# Patient Record
Sex: MALE | Race: WHITE | Employment: OTHER | URBAN - METROPOLITAN AREA
[De-identification: names, ages, dates, MRNs, and addresses within clinical notes are randomized per-mention and may not be internally consistent; named-entity substitution may affect disease eponyms.]

---

## 2018-04-18 ENCOUNTER — HOSPITAL ENCOUNTER (OUTPATIENT)
Dept: GENERAL RADIOLOGY | Age: 83
Discharge: HOME | End: 2018-04-18
Attending: PHYSICAL MEDICINE & REHABILITATION
Payer: MEDICARE

## 2018-04-18 ENCOUNTER — OFFICE VISIT (OUTPATIENT)
Dept: NEUROLOGY | Facility: CLINIC | Age: 83
End: 2018-04-18

## 2018-04-18 ENCOUNTER — TELEPHONE (OUTPATIENT)
Dept: NEUROLOGY | Facility: CLINIC | Age: 83
End: 2018-04-18

## 2018-04-18 VITALS
SYSTOLIC BLOOD PRESSURE: 120 MMHG | DIASTOLIC BLOOD PRESSURE: 78 MMHG | RESPIRATION RATE: 16 BRPM | HEIGHT: 72 IN | WEIGHT: 187 LBS | HEART RATE: 60 BPM | BODY MASS INDEX: 25.33 KG/M2

## 2018-04-18 DIAGNOSIS — M75.111 INCOMPLETE TEAR OF RIGHT ROTATOR CUFF: ICD-10-CM

## 2018-04-18 DIAGNOSIS — G89.29 CHRONIC RIGHT SHOULDER PAIN: ICD-10-CM

## 2018-04-18 DIAGNOSIS — G89.29 CHRONIC RIGHT SHOULDER PAIN: Primary | ICD-10-CM

## 2018-04-18 DIAGNOSIS — M54.2 NECK PAIN ON RIGHT SIDE: ICD-10-CM

## 2018-04-18 DIAGNOSIS — M19.011 PRIMARY OSTEOARTHRITIS OF RIGHT SHOULDER: ICD-10-CM

## 2018-04-18 DIAGNOSIS — M54.12 CERVICAL RADICULOPATHY: ICD-10-CM

## 2018-04-18 DIAGNOSIS — M25.511 CHRONIC RIGHT SHOULDER PAIN: ICD-10-CM

## 2018-04-18 DIAGNOSIS — M25.511 CHRONIC RIGHT SHOULDER PAIN: Primary | ICD-10-CM

## 2018-04-18 PROCEDURE — 73030 X-RAY EXAM OF SHOULDER: CPT | Performed by: PHYSICAL MEDICINE & REHABILITATION

## 2018-04-18 PROCEDURE — 99214 OFFICE O/P EST MOD 30 MIN: CPT | Performed by: PHYSICAL MEDICINE & REHABILITATION

## 2018-04-18 PROCEDURE — 72050 X-RAY EXAM NECK SPINE 4/5VWS: CPT | Performed by: PHYSICAL MEDICINE & REHABILITATION

## 2018-04-18 RX ORDER — GABAPENTIN 100 MG/1
100 CAPSULE ORAL NIGHTLY
Qty: 30 CAPSULE | Refills: 5 | Status: SHIPPED | OUTPATIENT
Start: 2018-04-18 | End: 2019-04-18

## 2018-04-18 RX ORDER — ALPRAZOLAM 0.5 MG/1
0.5 TABLET ORAL 3 TIMES DAILY PRN
Qty: 6 TABLET | Refills: 0 | Status: SHIPPED | OUTPATIENT
Start: 2018-04-18

## 2018-04-18 NOTE — PROGRESS NOTES
Cervical Pain H & P    Chief Complaint:  Patient presents with:  Shoulder Pain: Patient presents for right shoulder pain, states it doesnt radiate. Had injection with Dr. Jason Martinez in 12/2017, had some relief.  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     Social History     Social History  Social History   Marital status:   Spouse name: N/A    Years of education: N/A  Number of children: N/A     Occupational Histor rotated without much movement. The right eye is normal..    Skin:  There are no rashes or lesions. Lymph Nodes: The patient has no palpable submandibular, supraclavicular, and cervical lymph nodes. .    Vitals:   04/18/18  1131   BP: 120/78   Pulse: 60 Negative  Left external rotation: Negative     Assessment  1.  Chronic right shoulder pain    2. right subscapularis mild-mod full thickness tear, right infraspinatus mild-mod articular surface partila thickness tear, right supraspinatus mod-large full thic

## 2018-04-18 NOTE — TELEPHONE ENCOUNTER
Jennifer for [facility] [employer] Online for authorization of approval for MRI C-spine wo. Approval was given with Authorization # [id number] effective 04/18/18 to 06/02/18. Will call Pt. to inform. L/m advising of approval. Can proceed with scheduling appt.

## 2018-04-18 NOTE — PATIENT INSTRUCTIONS
As of October 6th 2014, the Drug Enforcement Agency [facility]) is reclassifying all hydrocodone combination medications from Schedule III to Schedule II. This includes medications such as Norco, Vicodin, Lortab, Zohydro, and Vicoprofen.     What this means for y chart.      Plan  The patient get an MRI scan of the cervical spine. The patient will get cervical spine flexion-extension x-rays. He will get an x-ray of the right shoulder.     Patient will call me after he had the above-stated imaging studies and I

## 2018-06-10 PROBLEM — M19.019 SHOULDER ARTHRITIS: Status: ACTIVE | Noted: 2018-06-10

## 2018-06-10 PROBLEM — M47.892 OTHER SPONDYLOSIS, CERVICAL REGION: Status: ACTIVE | Noted: 2018-06-10

## 2018-06-10 PROBLEM — Q76.1 CERVICAL FUSION SYNDROME: Status: ACTIVE | Noted: 2018-06-10

## 2018-06-12 ENCOUNTER — TELEPHONE (OUTPATIENT)
Dept: NEUROLOGY | Facility: CLINIC | Age: 83
End: 2018-06-12

## 2018-06-12 NOTE — TELEPHONE ENCOUNTER
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 He will need to have the CT scan as ordered. His neck is stable.

## 2018-06-12 NOTE — TELEPHONE ENCOUNTER
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 He will need to have the CT scan and then follow up.

## 2018-06-12 NOTE — TELEPHONE ENCOUNTER
Spoke with patient and his wife Ferny. Updated Dr. Connie Laurent remarks regarding recent c-spine xray. Patient verbalized understanding but will be going out of town and will follow up when they return.

## 2018-06-12 NOTE — TELEPHONE ENCOUNTER
Spoke with patient regarding right shoulder xray. Patient states he will be away on vacation and will follow up with Antoinette Zimmer when they return.

## (undated) NOTE — LETTER
4/18/2018      Eva Rodriguez MD  Physical Medicine and Rehabilitation  [address] [facility]  Dept: [phone]  Dept Fax: [phone]        RE: Consultation for Bello Berumen        Dear Keely Stallworth,    Thank